# Patient Record
Sex: MALE | Race: WHITE | NOT HISPANIC OR LATINO | Employment: FULL TIME | RURAL
[De-identification: names, ages, dates, MRNs, and addresses within clinical notes are randomized per-mention and may not be internally consistent; named-entity substitution may affect disease eponyms.]

---

## 2021-08-23 ENCOUNTER — CLINICAL SUPPORT (OUTPATIENT)
Dept: PRIMARY CARE CLINIC | Facility: CLINIC | Age: 26
End: 2021-08-23

## 2021-08-23 DIAGNOSIS — R94.120 ABNORMAL AUDIOGRAM: ICD-10-CM

## 2021-08-23 PROCEDURE — 92552 PR PURE TONE AUDIOMETRY, AIR: ICD-10-PCS | Mod: ,,, | Performed by: NURSE PRACTITIONER

## 2021-08-23 PROCEDURE — 92552 PURE TONE AUDIOMETRY AIR: CPT | Mod: ,,, | Performed by: NURSE PRACTITIONER

## 2023-04-11 ENCOUNTER — CLINICAL SUPPORT (OUTPATIENT)
Dept: PRIMARY CARE CLINIC | Facility: CLINIC | Age: 28
End: 2023-04-11

## 2023-04-11 DIAGNOSIS — Z01.10 ENCOUNTER FOR HEARING EXAMINATION, UNSPECIFIED WHETHER ABNORMAL FINDINGS: Primary | ICD-10-CM

## 2023-04-11 PROCEDURE — 92552 PR PURE TONE AUDIOMETRY, AIR: ICD-10-PCS | Mod: ,,, | Performed by: NURSE PRACTITIONER

## 2023-04-11 PROCEDURE — 92552 PURE TONE AUDIOMETRY AIR: CPT | Mod: ,,, | Performed by: NURSE PRACTITIONER

## 2023-04-11 NOTE — PROGRESS NOTES
Subjective     Patient ID: Ori Ramey is a 27 y.o. male.    Chief Complaint: No chief complaint on file.    HPI  Review of Systems       Objective     Physical Exam       Assessment and Plan     Problem List Items Addressed This Visit    None  Visit Diagnoses       Encounter for hearing examination, unspecified whether abnormal findings    -  Primary            Audiogram only

## 2024-03-04 ENCOUNTER — HOSPITAL ENCOUNTER (EMERGENCY)
Facility: HOSPITAL | Age: 29
Discharge: HOME | End: 2024-03-04
Payer: COMMERCIAL

## 2024-03-04 VITALS
RESPIRATION RATE: 16 BRPM | OXYGEN SATURATION: 99 % | TEMPERATURE: 98 F | DIASTOLIC BLOOD PRESSURE: 78 MMHG | BODY MASS INDEX: 31.42 KG/M2 | SYSTOLIC BLOOD PRESSURE: 132 MMHG | WEIGHT: 258 LBS | HEART RATE: 78 BPM | HEIGHT: 76 IN

## 2024-03-04 DIAGNOSIS — R07.9 CHEST PAIN: ICD-10-CM

## 2024-03-04 DIAGNOSIS — J06.9 UPPER RESPIRATORY TRACT INFECTION, UNSPECIFIED TYPE: Primary | ICD-10-CM

## 2024-03-04 DIAGNOSIS — Z91.89 AT HIGH RISK FOR CARDIOVASCULAR DISEASE: ICD-10-CM

## 2024-03-04 DIAGNOSIS — Z82.49 FAMILY HISTORY OF MI (MYOCARDIAL INFARCTION): ICD-10-CM

## 2024-03-04 LAB
ANION GAP SERPL CALCULATED.3IONS-SCNC: 13 MMOL/L (ref 7–16)
BASOPHILS # BLD AUTO: 0.02 K/UL (ref 0–0.2)
BASOPHILS NFR BLD AUTO: 0.3 % (ref 0–1)
BUN SERPL-MCNC: 23 MG/DL (ref 7–18)
BUN/CREAT SERPL: 21 (ref 6–20)
CALCIUM SERPL-MCNC: 9.2 MG/DL (ref 8.5–10.1)
CHLORIDE SERPL-SCNC: 103 MMOL/L (ref 98–107)
CO2 SERPL-SCNC: 27 MMOL/L (ref 21–32)
CREAT SERPL-MCNC: 1.09 MG/DL (ref 0.7–1.3)
DIFFERENTIAL METHOD BLD: ABNORMAL
EGFR (NO RACE VARIABLE) (RUSH/TITUS): 95 ML/MIN/1.73M2
EOSINOPHIL # BLD AUTO: 0.1 K/UL (ref 0–0.5)
EOSINOPHIL NFR BLD AUTO: 1.6 % (ref 1–4)
ERYTHROCYTE [DISTWIDTH] IN BLOOD BY AUTOMATED COUNT: 13.2 % (ref 11.5–14.5)
GLUCOSE SERPL-MCNC: 96 MG/DL (ref 74–106)
HCT VFR BLD AUTO: 42.6 % (ref 40–54)
HGB BLD-MCNC: 15.2 G/DL (ref 13.5–18)
IMM GRANULOCYTES # BLD AUTO: 0.02 K/UL (ref 0–0.04)
IMM GRANULOCYTES NFR BLD: 0.3 % (ref 0–0.4)
LYMPHOCYTES # BLD AUTO: 1.41 K/UL (ref 1–4.8)
LYMPHOCYTES NFR BLD AUTO: 23 % (ref 27–41)
MCH RBC QN AUTO: 30.4 PG (ref 27–31)
MCHC RBC AUTO-ENTMCNC: 35.7 G/DL (ref 32–36)
MCV RBC AUTO: 85.2 FL (ref 80–96)
MONOCYTES # BLD AUTO: 0.95 K/UL (ref 0–0.8)
MONOCYTES NFR BLD AUTO: 15.5 % (ref 2–6)
MPC BLD CALC-MCNC: 10.5 FL (ref 9.4–12.4)
NEUTROPHILS # BLD AUTO: 3.63 K/UL (ref 1.8–7.7)
NEUTROPHILS NFR BLD AUTO: 59.3 % (ref 53–65)
NRBC # BLD AUTO: 0 X10E3/UL
NRBC, AUTO (.00): 0 %
PLATELET # BLD AUTO: 217 K/UL (ref 150–400)
POTASSIUM SERPL-SCNC: 4.2 MMOL/L (ref 3.5–5.1)
RBC # BLD AUTO: 5 M/UL (ref 4.6–6.2)
SODIUM SERPL-SCNC: 139 MMOL/L (ref 136–145)
TROPONIN I SERPL DL<=0.01 NG/ML-MCNC: <4 PG/ML
TROPONIN I SERPL DL<=0.01 NG/ML-MCNC: <4 PG/ML
TSH SERPL DL<=0.005 MIU/L-ACNC: 1.25 UIU/ML (ref 0.36–3.74)
WBC # BLD AUTO: 6.13 K/UL (ref 4.5–11)

## 2024-03-04 PROCEDURE — 84484 ASSAY OF TROPONIN QUANT: CPT | Performed by: NURSE PRACTITIONER

## 2024-03-04 PROCEDURE — 63600175 PHARM REV CODE 636 W HCPCS: Performed by: NURSE PRACTITIONER

## 2024-03-04 PROCEDURE — 99285 EMERGENCY DEPT VISIT HI MDM: CPT | Mod: 25

## 2024-03-04 PROCEDURE — 25000003 PHARM REV CODE 250: Performed by: NURSE PRACTITIONER

## 2024-03-04 PROCEDURE — 93010 ELECTROCARDIOGRAM REPORT: CPT | Mod: ,,, | Performed by: HOSPITALIST

## 2024-03-04 PROCEDURE — 99284 EMERGENCY DEPT VISIT MOD MDM: CPT | Mod: ,,, | Performed by: NURSE PRACTITIONER

## 2024-03-04 PROCEDURE — 93005 ELECTROCARDIOGRAM TRACING: CPT

## 2024-03-04 PROCEDURE — 84443 ASSAY THYROID STIM HORMONE: CPT | Performed by: NURSE PRACTITIONER

## 2024-03-04 PROCEDURE — 80048 BASIC METABOLIC PNL TOTAL CA: CPT | Performed by: NURSE PRACTITIONER

## 2024-03-04 PROCEDURE — 85025 COMPLETE CBC W/AUTO DIFF WBC: CPT | Performed by: NURSE PRACTITIONER

## 2024-03-04 PROCEDURE — 96372 THER/PROPH/DIAG INJ SC/IM: CPT | Performed by: NURSE PRACTITIONER

## 2024-03-04 RX ORDER — METHOCARBAMOL 750 MG/1
750 TABLET, FILM COATED ORAL
Status: COMPLETED | OUTPATIENT
Start: 2024-03-04 | End: 2024-03-04

## 2024-03-04 RX ORDER — AZITHROMYCIN 250 MG/1
250 TABLET, FILM COATED ORAL DAILY
Qty: 6 TABLET | Refills: 0 | Status: SHIPPED | OUTPATIENT
Start: 2024-03-04 | End: 2024-03-25

## 2024-03-04 RX ORDER — KETOROLAC TROMETHAMINE 30 MG/ML
30 INJECTION, SOLUTION INTRAMUSCULAR; INTRAVENOUS
Status: COMPLETED | OUTPATIENT
Start: 2024-03-04 | End: 2024-03-04

## 2024-03-04 RX ORDER — BACLOFEN 10 MG/1
10 TABLET ORAL 2 TIMES DAILY PRN
Qty: 20 TABLET | Refills: 0 | Status: SHIPPED | OUTPATIENT
Start: 2024-03-04 | End: 2024-03-25

## 2024-03-04 RX ADMIN — METHOCARBAMOL TABLETS 750 MG: 750 TABLET, COATED ORAL at 10:03

## 2024-03-04 RX ADMIN — KETOROLAC TROMETHAMINE 30 MG: 30 INJECTION, SOLUTION INTRAMUSCULAR; INTRAVENOUS at 09:03

## 2024-03-05 NOTE — ED PROVIDER NOTES
"Encounter Date: 3/4/2024       History     Chief Complaint   Patient presents with    Back Pain    Chest Pain     29 y/o WM presents to the emergency department with c/o having back pain and chest pain. He reports that his back pain started last night and was radiating into his neck and it was difficult for him to get comfortable. He states the pain was "between my shoulder blades". He denies injury or trauma. He states that he woke up generally not feeling well and with some congestion but he went on to work. He states that after he got there he began to have chest pain on the left side of his chest then in the center and then radiated up to his neck and arm. He states that he looked at his EKG on his watch several times and it did not look right to him. He states that his wife urged him to come to the emergency department. He reports taking Propranolol for anxiety and has taken 40mg today. He states that his brother  from a heart attack at the age of 30. He also reports that his grandfather  from a MI in what he believes was his 60s. He states that both had no prior hx of heart disease. He also states he became concerned as he did take the COVID vaccine and he has heard that young males have had some issues related to the vaccine and believes this may be contributing to his anxiety as well. He denies excessive caffeine intake; states he tries not to drink caffeine daily. He does vape daily. He denies alcohol or illicit drug use. He has taken nothing for his symptoms. There are no particular exacerbating or remitting factors.       The history is provided by the patient.     Review of patient's allergies indicates:  No Known Allergies  No past medical history on file.  No past surgical history on file.  No family history on file.     Review of Systems   All other systems reviewed and are negative.      Physical Exam     Initial Vitals [24 1740]   BP Pulse Resp Temp SpO2   137/80 84 18 97.8 °F (36.6 " °C) 96 %      MAP       --         Physical Exam    Constitutional: He appears well-developed and well-nourished. He is active and cooperative.  Non-toxic appearance.   Cardiovascular:  Normal rate, regular rhythm, normal heart sounds and normal pulses.           Pulmonary/Chest: Effort normal and breath sounds normal.   Abdominal: Abdomen is soft. Bowel sounds are normal. There is no abdominal tenderness.     Neurological: He is alert and oriented to person, place, and time.   Skin: Skin is warm, dry and intact. Capillary refill takes less than 2 seconds.   Psychiatric: He has a normal mood and affect. His speech is normal and behavior is normal. Judgment and thought content normal. Cognition and memory are normal.         Medical Screening Exam   See Full Note    ED Course   Procedures  Labs Reviewed   BASIC METABOLIC PANEL - Abnormal; Notable for the following components:       Result Value    BUN 23 (*)     BUN/Creatinine Ratio 21 (*)     All other components within normal limits   CBC WITH DIFFERENTIAL - Abnormal; Notable for the following components:    Lymphocytes % 23.0 (*)     Monocytes % 15.5 (*)     Monocytes, Absolute 0.95 (*)     All other components within normal limits   TROPONIN I - Normal   TSH - Normal   TROPONIN I - Normal   CBC W/ AUTO DIFFERENTIAL    Narrative:     The following orders were created for panel order CBC auto differential.  Procedure                               Abnormality         Status                     ---------                               -----------         ------                     CBC with Differential[[id number]]       Abnormal            Final result                 Please view results for these tests on the individual orders.          Imaging Results              X-Ray Chest PA And Lateral (Final result)  Result time 03/04/24 18:24:29      Final result by Sanjeev Gomez DO (03/04/24 18:24:29)                   Impression:      No acute pulmonary  "disease      Electronically signed by: Sanjeev Gomez  Date:    2024  Time:    18:24               Narrative:    EXAMINATION:  XR CHEST PA AND LATERAL    CLINICAL HISTORY:  Chest pain, unspecified    TECHNIQUE:  XR CHEST PA AND LATERAL    COMPARISON:      FINDINGS:  No lines or tubes.    Lungs are clear.    Normal pleura.    Cardiac silhouette is similar to comparison exam.    No obvious acute bone findings.                                       Medications   ketorolac injection 30 mg (30 mg Intramuscular Given 3/4/24 2155)   methocarbamoL tablet 750 mg (750 mg Oral Given 3/4/24 842)     Medical Decision Making  29 y/o WM presents to the emergency department with c/o having back pain and chest pain. He reports that his back pain started last night and was radiating into his neck and it was difficult for him to get comfortable. He states the pain was "between my shoulder blades". He denies injury or trauma. He states that he woke up generally not feeling well and with some congestion but he went on to work. He states that after he got there he began to have chest pain on the left side of his chest then in the center and then radiated up to his neck and arm. He states that he looked at his EKG on his watch several times and it did not look right to him. He states that his wife urged him to come to the emergency department. He reports taking Propranolol for anxiety and has taken 40mg today. He states that his brother  from a heart attack at the age of 30. He also reports that his grandfather  from a MI in what he believes was his 60s. He states that both had no prior hx of heart disease. He also states he became concerned as he did take the COVID vaccine and he has heard that young males have had some issues related to the vaccine and believes this may be contributing to his anxiety as well. He denies excessive caffeine intake; states he tries not to drink caffeine daily. He does vape daily. He " denies alcohol or illicit drug use. He has taken nothing for his symptoms. There are no particular exacerbating or remitting factors.       Problems Addressed:  Chest pain:     Details: EKG without acute ST or T wave abnormalities. Serial troponins negative. Pt with + family history, referral sent to cards. Toradol and Robaxin given for pain here in the emergency department. Rx for Baclofen, counseled on use and supportive measures. Educated on healthy lifestyle. Follow up instructions given. Warning s/s discussed and return precautions given; the patient has v/u.    Family history of MI (myocardial infarction):     Details: Referral sent to cardiology.  Upper respiratory tract infection, unspecified type:     Details: Zpack sent, counseled on use and supportive measures. Instructed to avoid decongestants. Follow up instructions given. Warning s/s discussed and return precautions given; the patient has v/u.      Amount and/or Complexity of Data Reviewed  Labs: ordered.  Radiology: ordered.    Risk  OTC drugs.  Prescription drug management.                                      Clinical Impression:   Final diagnoses:  [R07.9] Chest pain  [J06.9] Upper respiratory tract infection, unspecified type (Primary)  [Z82.49] Family history of MI (myocardial infarction)  [Z91.89] At high risk for cardiovascular disease        ED Disposition Condition    Discharge Stable          ED Prescriptions       Medication Sig Dispense Start Date End Date Auth. Provider    azithromycin (Z-MIRIAM) 250 MG tablet Take 1 tablet (250 mg total) by mouth once daily. Take first 2 tablets together, then 1 every day until finished. 6 tablet 3/4/2024 -- Shirley Medley FNP    baclofen (LIORESAL) 10 MG tablet Take 1 tablet (10 mg total) by mouth 2 (two) times daily as needed (MUSCLE SPASMS). THIS MEDICATION MAY MAKE YOU SLEEPY 20 tablet 3/4/2024 3/4/2025 Shirley Medley FNP          Follow-up Information       Follow up With Specialties Details Why  Contact Info    Primary Care Provider  Schedule an appointment as soon as possible for a visit                Shirley Medley FNP  03/04/24 6948

## 2024-03-05 NOTE — DISCHARGE INSTRUCTIONS
Use prescriptions as directed. Alternate Tylenol and Ibuprofen as needed for pain. Ensure you are drinking plenty of fluids, especially water. Try to avoid nicotine or excessive caffeine intake. Follow a healthy, lowe fat diet. Make an appointment to follow up with your primary care provider within the next 1-2 weeks. A referral has been sent to cardiology, they should call you with an appointment date and time. Return to the ED for worsening signs and symptoms or otherwise as needed.

## 2024-03-06 LAB
OHS QRS DURATION: 94 MS
OHS QTC CALCULATION: 411 MS

## 2024-03-25 ENCOUNTER — OFFICE VISIT (OUTPATIENT)
Dept: CARDIOLOGY | Facility: CLINIC | Age: 29
End: 2024-03-25
Payer: COMMERCIAL

## 2024-03-25 VITALS
HEART RATE: 93 BPM | OXYGEN SATURATION: 99 % | HEIGHT: 76 IN | WEIGHT: 262 LBS | SYSTOLIC BLOOD PRESSURE: 120 MMHG | DIASTOLIC BLOOD PRESSURE: 80 MMHG | BODY MASS INDEX: 31.9 KG/M2

## 2024-03-25 DIAGNOSIS — Z91.89 AT HIGH RISK FOR CARDIOVASCULAR DISEASE: ICD-10-CM

## 2024-03-25 DIAGNOSIS — R00.2 INTERMITTENT PALPITATIONS: ICD-10-CM

## 2024-03-25 DIAGNOSIS — R07.9 CHEST PAIN: ICD-10-CM

## 2024-03-25 DIAGNOSIS — R07.9 CHEST PAIN, UNSPECIFIED TYPE: Primary | ICD-10-CM

## 2024-03-25 DIAGNOSIS — Z82.49 FAMILY HISTORY OF MI (MYOCARDIAL INFARCTION): ICD-10-CM

## 2024-03-25 DIAGNOSIS — R07.2 PRECORDIAL PAIN: ICD-10-CM

## 2024-03-25 PROCEDURE — 3008F BODY MASS INDEX DOCD: CPT | Mod: ,,, | Performed by: INTERNAL MEDICINE

## 2024-03-25 PROCEDURE — 1160F RVW MEDS BY RX/DR IN RCRD: CPT | Mod: ,,, | Performed by: INTERNAL MEDICINE

## 2024-03-25 PROCEDURE — 3074F SYST BP LT 130 MM HG: CPT | Mod: ,,, | Performed by: INTERNAL MEDICINE

## 2024-03-25 PROCEDURE — 93010 ELECTROCARDIOGRAM REPORT: CPT | Mod: S$PBB,,, | Performed by: INTERNAL MEDICINE

## 2024-03-25 PROCEDURE — 1159F MED LIST DOCD IN RCRD: CPT | Mod: ,,, | Performed by: INTERNAL MEDICINE

## 2024-03-25 PROCEDURE — 99205 OFFICE O/P NEW HI 60 MIN: CPT | Mod: S$PBB,,, | Performed by: INTERNAL MEDICINE

## 2024-03-25 PROCEDURE — 93242 EXT ECG>48HR<7D RECORDING: CPT | Performed by: INTERNAL MEDICINE

## 2024-03-25 PROCEDURE — 99214 OFFICE O/P EST MOD 30 MIN: CPT | Mod: PBBFAC,25 | Performed by: INTERNAL MEDICINE

## 2024-03-25 PROCEDURE — 93005 ELECTROCARDIOGRAM TRACING: CPT | Mod: PBBFAC | Performed by: INTERNAL MEDICINE

## 2024-03-25 PROCEDURE — 3079F DIAST BP 80-89 MM HG: CPT | Mod: ,,, | Performed by: INTERNAL MEDICINE

## 2024-03-25 RX ORDER — METHYLPHENIDATE HYDROCHLORIDE 54 MG/1
54 TABLET ORAL
COMMUNITY
Start: 2024-03-11

## 2024-03-25 RX ORDER — PROPRANOLOL HYDROCHLORIDE 20 MG/1
20 TABLET ORAL 3 TIMES DAILY PRN
COMMUNITY
Start: 2024-01-11

## 2024-03-25 NOTE — PROGRESS NOTES
"PCP: Elizabeth, Primary Doctor    Referring Provider:     Subjective:   Ori Ramey is a 28 y.o. male  who presents for evaluation of palpitations.    Pt reports episodes of heart racing and skipping over last four years.  He notes has several episodes which last seconds, which he ignores, longest episode occurred 3/4/2024.  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  Discharged to home.  Pt reports dull ache pain with palpitations, squeezing sensation, 7/10 at most severe, resolved with resolution of palpitations.   No previous cardiac history.   He is active, working flight line, carrying chains ect.         Fhx:  Family History   Problem Relation Age of Onset    Hypertension Mother     Diabetes Mother     Thyroid disease Mother     Spine Surgery Mother     COPD Father     Heart attacks under age 50 Brother         age 31    Spine Surgery Brother         spine deteriation    Arthritis Brother     Arthritis Brother       Past Surgical History:   Procedure Laterality Date    TONSILLECTOMY  2004        EKG - NSR, normal    ECHO - No results found for this or any previous visit.       CATH - No results found for this or any previous visit.       Stress - No results found for this or any previous visit.       Lab Results   Component Value Date     03/04/2024    K 4.2 03/04/2024     03/04/2024    CO2 27 03/04/2024    BUN 23 (H) 03/04/2024    CREATININE 1.09 03/04/2024    CALCIUM 9.2 03/04/2024    ANIONGAP 13 03/04/2024       No results found for: "CHOL"  No results found for: "HDL"  No results found for: "LDLCALC"  No results found for: "TRIG"  No results found for: "CHOLHDL"    Lab Results   Component Value Date    WBC 6.13 03/04/2024    HGB 15.2 03/04/2024    HCT 42.6 03/04/2024    MCV 85.2 03/04/2024     " 03/04/2024           Current Outpatient Medications:     methylphenidate HCl 54 MG CR tablet, Take 54 mg by mouth., Disp: , Rfl:     propranoloL (INDERAL) 20 MG tablet, Take 20 mg by mouth 3 (three) times daily as needed., Disp: , Rfl:     Review of Systems   Constitutional: Negative for diaphoresis, malaise/fatigue, night sweats and weight gain.   HENT:  Negative for congestion, ear pain, hearing loss, nosebleeds and sore throat.    Eyes:  Negative for blurred vision, double vision, pain, photophobia and visual disturbance.   Cardiovascular:  Positive for palpitations. Negative for chest pain, claudication, dyspnea on exertion, irregular heartbeat, leg swelling, near-syncope, orthopnea and syncope.        Started inderal 6 mo ago for palpitations, symptoms improved on Inderal.  He notes anxiety after brothers death, family isures.    Respiratory:  Negative for cough, shortness of breath, sleep disturbances due to breathing, snoring and wheezing.    Endocrine: Negative for cold intolerance, heat intolerance, polydipsia, polyphagia and polyuria.   Hematologic/Lymphatic: Negative for bleeding problem. Does not bruise/bleed easily.   Skin:  Negative for dry skin, flushing, itching, rash and skin cancer.   Musculoskeletal:  Negative for arthritis, back pain, falls, joint pain, muscle cramps, muscle weakness and myalgias.   Gastrointestinal:  Negative for abdominal pain, change in bowel habit, constipation, diarrhea, dysphagia, heartburn, nausea and vomiting.   Genitourinary:  Negative for bladder incontinence, dysuria, flank pain, frequency and nocturia.   Neurological:  Negative for dizziness, focal weakness, headaches, light-headedness, loss of balance, numbness, paresthesias and seizures.   Psychiatric/Behavioral:  Negative for depression, memory loss and substance abuse. The patient is nervous/anxious.         ADD: on adderal, was off adderal for six months, changed to concerta, less mood swings.   "  Allergic/Immunologic: Negative for environmental allergies.        Objective:   /80 (BP Location: Left arm, Patient Position: Sitting)   Pulse 93   Ht 6' 4" (1.93 m)   Wt 118.8 kg (262 lb)   SpO2 99%   BMI 31.89 kg/m²       Physical Exam  Vitals and nursing note reviewed.   Constitutional:       Appearance: Normal appearance. He is obese.   HENT:      Head: Normocephalic and atraumatic.      Right Ear: External ear normal.      Left Ear: External ear normal.   Eyes:      General: No scleral icterus.        Right eye: No discharge.         Left eye: No discharge.      Extraocular Movements: Extraocular movements intact.      Conjunctiva/sclera: Conjunctivae normal.      Pupils: Pupils are equal, round, and reactive to light.   Cardiovascular:      Rate and Rhythm: Normal rate and regular rhythm.      Pulses: Normal pulses.      Heart sounds: Normal heart sounds. No murmur heard.     No friction rub. No gallop.   Pulmonary:      Effort: Pulmonary effort is normal.      Breath sounds: Normal breath sounds. No wheezing, rhonchi or rales.   Chest:      Chest wall: No tenderness.   Abdominal:      General: Abdomen is flat. Bowel sounds are normal. There is no distension.      Palpations: Abdomen is soft.      Tenderness: There is no abdominal tenderness. There is no guarding or rebound.   Musculoskeletal:         General: No swelling or tenderness. Normal range of motion.      Cervical back: Normal range of motion and neck supple.   Skin:     General: Skin is warm and dry.      Findings: No erythema or rash.   Neurological:      General: No focal deficit present.      Mental Status: He is alert and oriented to person, place, and time.      Cranial Nerves: No cranial nerve deficit.      Motor: No weakness.      Gait: Gait normal.   Psychiatric:         Mood and Affect: Mood normal.         Behavior: Behavior normal.         Thought Content: Thought content normal.         Judgment: Judgment normal. "     Assessment:     1. Chest pain, unspecified type  EKG 12-lead      2. Chest pain  Ambulatory referral/consult to Cardiology      3. Family history of MI (myocardial infarction)  Ambulatory referral/consult to Cardiology      4. At high risk for cardiovascular disease  Ambulatory referral/consult to Cardiology      5. Intermittent palpitations              Plan:   Chest pain: atypical, brother  recently at 31, will order treadmill stress test, echo to evaluate for structural heart disease  Palpitations: unclear etiology, on propranolol, will order Zio to monitor rhythm  ADD: somewhat controlled no current meds  Anxiety: not well controlled.    Follow up to review results of above.

## 2024-03-26 LAB
OHS QRS DURATION: 92 MS
OHS QTC CALCULATION: 434 MS

## 2024-04-15 PROCEDURE — 93244 EXT ECG>48HR<7D REV&INTERPJ: CPT | Mod: ,,, | Performed by: INTERNAL MEDICINE

## 2025-08-05 DIAGNOSIS — S93.491A SPRAIN OF OTHER LIGAMENT OF RIGHT ANKLE, INITIAL ENCOUNTER: Primary | ICD-10-CM

## 2025-08-12 ENCOUNTER — CLINICAL SUPPORT (OUTPATIENT)
Dept: REHABILITATION | Facility: HOSPITAL | Age: 30
End: 2025-08-12
Payer: COMMERCIAL

## 2025-08-12 DIAGNOSIS — S93.401D SPRAIN OF RIGHT ANKLE, UNSPECIFIED LIGAMENT, SUBSEQUENT ENCOUNTER: Primary | ICD-10-CM

## 2025-08-12 PROBLEM — S93.401A SPRAIN OF RIGHT ANKLE: Status: ACTIVE | Noted: 2025-08-12

## 2025-08-12 PROCEDURE — 97161 PT EVAL LOW COMPLEX 20 MIN: CPT

## 2025-08-18 ENCOUNTER — CLINICAL SUPPORT (OUTPATIENT)
Dept: REHABILITATION | Facility: HOSPITAL | Age: 30
End: 2025-08-18
Payer: COMMERCIAL

## 2025-08-18 DIAGNOSIS — S93.401D SPRAIN OF RIGHT ANKLE, UNSPECIFIED LIGAMENT, SUBSEQUENT ENCOUNTER: Primary | ICD-10-CM

## 2025-08-18 PROCEDURE — 97140 MANUAL THERAPY 1/> REGIONS: CPT

## 2025-08-18 PROCEDURE — 97112 NEUROMUSCULAR REEDUCATION: CPT

## 2025-08-18 PROCEDURE — 97110 THERAPEUTIC EXERCISES: CPT
